# Patient Record
Sex: FEMALE | Race: ASIAN
[De-identification: names, ages, dates, MRNs, and addresses within clinical notes are randomized per-mention and may not be internally consistent; named-entity substitution may affect disease eponyms.]

---

## 2019-09-08 ENCOUNTER — HOSPITAL ENCOUNTER (EMERGENCY)
Dept: HOSPITAL 25 - ED | Age: 59
Discharge: HOME | End: 2019-09-08
Payer: SELF-PAY

## 2019-09-08 VITALS — DIASTOLIC BLOOD PRESSURE: 75 MMHG | SYSTOLIC BLOOD PRESSURE: 150 MMHG

## 2019-09-08 DIAGNOSIS — F39: Primary | ICD-10-CM

## 2019-09-08 DIAGNOSIS — F32.9: ICD-10-CM

## 2019-09-08 DIAGNOSIS — I10: ICD-10-CM

## 2019-09-08 PROCEDURE — 99285 EMERGENCY DEPT VISIT HI MDM: CPT

## 2019-09-08 NOTE — ED
Psychiatric Complaint





- HPI Summary


HPI Summary: 





This pt is a 60 Y/O F brought in by EMS to [facility] for an attempted suicide due 

to depression and recent stress, that occurred PTA. The pt states that she has 

recently moved to [place] and has not been able to fit in with her community. 

She also has been having financial troubles back in [place] due to moving into a 

new house. Her son states that she owes the bank a lot of money. She recently 

found out that her son got  and does not approve of the marriage and has 

been having difficulties with coming to terms about it. Her son states that she 

was venting about how difficult her life was and stating that she wanted a 

grandchild and her son wasnt able to give her that. She states that she is 

present in [place] for another month before heading home. She states that she 

does not have enough time to talk to her son due to his job. She states that 

after all these factors came together she attempted to strangle herself with 

her necklace before her son called EMS. Her son states that she was pulling her 

hair out and hitting herself in the head with a shoe. She also was lying on the 

ground hitting the ground. She states that she was shocked that her son 

reported her to the police. She states that she feels very embarrassed and 

ashamed. She states that she has no previous or chronic psychiatric illness. 

She has never been admitted to a hospital. She states that she takes sleeping 

pills occasionally. She denies any SOB, CP, SI, HI, headaches, fevers, chills, N

/V, abdominal pains, and weakness. She states that she has had no alleviating 

factors. She states that she wants no additional check-up here since she is 

home in about a month. Her medications and allergies were reviewed. Her son 

states that she was recommended to be hospitalized for mental health issues. 





- History Of Current Complaint


Hx Obtained From: Patient,  - Giancarlo


Pregnant?: No


Onset/Duration: Gradual Onset - 2 months, since arriving in [place]


Timing: Constant


Severity Initially: Moderate


Severity Currently: Severe


Character: Depressed


Aggravating Factor(s): Recent Stress


Alleviating Factor(s): Nothing


Related History: 


   Negative For: Prior Psychiatric Issues


Has Suicidal: Reports: Has Prior Attempt(s) - 1 attmept, states that she tried 

to strangle herself with her necklace PTA..  Denies: Thoughts, With A Plan


Has Homicidal: Denies: Thoughts, With A Plan


Recent Stressor(s): issues with daughter-in-law, being unable to speak english, 





- Allergies/Home Medications


Allergies/Adverse Reactions: 


 Allergies











Allergy/AdvReac Type Severity Reaction Status Date / Time


 


No Known Allergies Allergy   Verified 09/08/19 13:30











Home Medications: 


 Home Medications





NK [No Home Medications Reported]  09/08/19 [History Confirmed 09/08/19]











PMH/Surg Hx/FS Hx/Imm Hx


Previously Healthy: Yes


Endocrine/Hematology History: 


   Denies: Hx Diabetes


Cardiovascular History: Reports: Hx Hypertension


Sensory History: 


   Denies: Hx Contacts or Glasses, Hx Legally Blind


Opthamlomology History: 


   Denies: Hx Contacts or Glasses, Hx Legally Blind


Psychiatric History: 


   Denies: Hx Inpatient Treatment, Hx Suicide Attempt





- Surgical History


Surgical History: None


Infectious Disease History: 


   Denies: Traveled Outside the [place] in Last 30 Days





- Family History


Known Family History: Positive: Hypertension





- Social History


Occupation: Retired


Lives: With Family


Alcohol Use: None


Hx Substance Use: No


Substance Use Type: Reports: None


Hx Tobacco Use: No


Smoking Status (MU): Never Smoked Tobacco





Review of Systems


Negative: Fever, Chills


Negative: Chest Pain


Negative: Shortness Of Breath


Negative: Abdominal Pain, Vomiting, Nausea


Musculoskeletal: Negative - neck pain 


Negative: Headache, Weakness


Psychological: Other - stressed, embarassed, ashamed 


Positive: Depressed, Other - Negative: SI and HI


All Other Systems Reviewed And Are Negative: Yes





Physical Exam


Triage Information Reviewed: Yes


Vital Signs Reviewed: Yes





Re-Evaluation





- Re-Evaluation


  ** First Eval


Re-Evaluation Time: 13:24


Comment: Her son states that her plane ticket will be changed to a sooner date





Course/Dx





- Course


Course Of Treatment: Patient is here after getting in an argument with her son 

and doing some self harm.  Patient denies any suicidal thoughts or actions 

here.  Patient is from [place] and is having a hard time assimilating the culture 

and excepting her son's partner.  Patient was involved by psychiatry who deemed 

her safe for discharge.





- Differential Dx/Clinical Impression


Provider Diagnosis: 


 Mood disorder








- Physician Notifications


Discussed Care Of Patient With: Sudheer Holden


Time Discussed With Above Provider: 15:14


Instructed by Provider To: Other - discharge





Discharge ED





- Sign-Out/Discharge


Documenting (check all that apply): Patient Departure - discharge


Patient Received Moderate/Deep Sedation with Procedure: No





- Discharge Plan


Condition: Stable


Disposition: HOME


Referrals: 


No Primary Care Phys,NOPCP [Primary Care Provider] - 





- Billing Disposition and Condition


Condition: STABLE


Disposition: Home





- Attestation Statements


Document Initiated by Scribe: Yes


Documenting Scribe: Alessandro Shirley


Provider For Whom Scribe is Documenting (Include Credential): Marcio Espana


Scribe Attestation: 


Alessandro HADDAD, scribed for Marcio Espana on 09/08/19 at 1638. 


Scribe Documentation Reviewed: Yes


Provider Attestation: 


The documentation as recorded by the Alessandro bailey accurately reflects 

the service I personally performed and the decisions made by Marcio conteh


Status of Scribe Document: Viewed